# Patient Record
Sex: FEMALE
[De-identification: names, ages, dates, MRNs, and addresses within clinical notes are randomized per-mention and may not be internally consistent; named-entity substitution may affect disease eponyms.]

---

## 2017-07-20 ENCOUNTER — RX RENEWAL (OUTPATIENT)
Age: 72
End: 2017-07-20

## 2017-08-31 ENCOUNTER — APPOINTMENT (OUTPATIENT)
Dept: INTERNAL MEDICINE | Facility: CLINIC | Age: 72
End: 2017-08-31

## 2017-09-21 ENCOUNTER — APPOINTMENT (OUTPATIENT)
Dept: INTERNAL MEDICINE | Facility: CLINIC | Age: 72
End: 2017-09-21
Payer: MEDICARE

## 2017-09-21 ENCOUNTER — LABORATORY RESULT (OUTPATIENT)
Age: 72
End: 2017-09-21

## 2017-09-21 VITALS
BODY MASS INDEX: 28.47 KG/M2 | OXYGEN SATURATION: 98 % | RESPIRATION RATE: 16 BRPM | TEMPERATURE: 97.9 F | DIASTOLIC BLOOD PRESSURE: 80 MMHG | WEIGHT: 145 LBS | SYSTOLIC BLOOD PRESSURE: 130 MMHG | HEIGHT: 60 IN | HEART RATE: 80 BPM

## 2017-09-21 PROCEDURE — 99214 OFFICE O/P EST MOD 30 MIN: CPT

## 2017-09-22 LAB
25(OH)D3 SERPL-MCNC: 23.2 NG/ML
ALBUMIN SERPL ELPH-MCNC: 4.2 G/DL
ALP BLD-CCNC: 71 U/L
ALT SERPL-CCNC: 12 U/L
ANION GAP SERPL CALC-SCNC: 14 MMOL/L
APPEARANCE: CLEAR
AST SERPL-CCNC: 14 U/L
BASOPHILS # BLD AUTO: 0.02 K/UL
BASOPHILS NFR BLD AUTO: 0.2 %
BILIRUB SERPL-MCNC: 0.6 MG/DL
BILIRUBIN URINE: NEGATIVE
BLOOD URINE: ABNORMAL
BUN SERPL-MCNC: 14 MG/DL
CALCIUM SERPL-MCNC: 9.7 MG/DL
CHLORIDE SERPL-SCNC: 100 MMOL/L
CHOLEST SERPL-MCNC: 226 MG/DL
CHOLEST/HDLC SERPL: 3.4 RATIO
CO2 SERPL-SCNC: 27 MMOL/L
COLOR: YELLOW
CREAT SERPL-MCNC: 0.77 MG/DL
CREAT SPEC-SCNC: 22 MG/DL
EOSINOPHIL # BLD AUTO: 0.03 K/UL
EOSINOPHIL NFR BLD AUTO: 0.3 %
ERYTHROCYTE [SEDIMENTATION RATE] IN BLOOD BY WESTERGREN METHOD: 7 MM/HR
FOLATE SERPL-MCNC: >20 NG/ML
GGT SERPL-CCNC: 15 U/L
GLUCOSE QUALITATIVE U: NORMAL MG/DL
GLUCOSE SERPL-MCNC: 103 MG/DL
HBA1C MFR BLD HPLC: 5.5 %
HCT VFR BLD CALC: 41 %
HDLC SERPL-MCNC: 67 MG/DL
HGB BLD-MCNC: 13.4 G/DL
IMM GRANULOCYTES NFR BLD AUTO: 0.2 %
IRON SATN MFR SERPL: 27 %
IRON SERPL-MCNC: 103 UG/DL
KETONES URINE: NEGATIVE
LDLC SERPL CALC-MCNC: 137 MG/DL
LEUKOCYTE ESTERASE URINE: NEGATIVE
LYMPHOCYTES # BLD AUTO: 1.97 K/UL
LYMPHOCYTES NFR BLD AUTO: 18.7 %
MAN DIFF?: NORMAL
MCHC RBC-ENTMCNC: 29 PG
MCHC RBC-ENTMCNC: 32.7 GM/DL
MCV RBC AUTO: 88.7 FL
MICROALBUMIN 24H UR DL<=1MG/L-MCNC: 0.8 MG/DL
MICROALBUMIN/CREAT 24H UR-RTO: 36 MG/G
MONOCYTES # BLD AUTO: 0.64 K/UL
MONOCYTES NFR BLD AUTO: 6.1 %
NEUTROPHILS # BLD AUTO: 7.83 K/UL
NEUTROPHILS NFR BLD AUTO: 74.5 %
NITRITE URINE: NEGATIVE
PH URINE: 7
PLATELET # BLD AUTO: 330 K/UL
POTASSIUM SERPL-SCNC: 4.1 MMOL/L
PROT SERPL-MCNC: 6.8 G/DL
PROTEIN URINE: NEGATIVE MG/DL
RBC # BLD: 4.62 M/UL
RBC # FLD: 14 %
SODIUM SERPL-SCNC: 141 MMOL/L
SPECIFIC GRAVITY URINE: 1.01
T3 SERPL-MCNC: 134 NG/DL
T4 FREE SERPL-MCNC: 1.6 NG/DL
TIBC SERPL-MCNC: 385 UG/DL
TRIGL SERPL-MCNC: 109 MG/DL
TSH SERPL-ACNC: 2.35 UIU/ML
UIBC SERPL-MCNC: 282 UG/DL
UROBILINOGEN URINE: NORMAL MG/DL
VIT B12 SERPL-MCNC: 934 PG/ML
WBC # FLD AUTO: 10.51 K/UL

## 2018-07-01 ENCOUNTER — RX RENEWAL (OUTPATIENT)
Age: 73
End: 2018-07-01

## 2019-04-24 NOTE — COUNSELING
[Healthy eating counseling provided] : healthy eating [Weight management counseling provided] : Weight management [Activity counseling provided] : activity [None] : None [Good understanding] : Patient has a good understanding of lifestyle changes and the steps needed to achieve self management goals

## 2019-04-25 ENCOUNTER — APPOINTMENT (OUTPATIENT)
Dept: INTERNAL MEDICINE | Facility: CLINIC | Age: 74
End: 2019-04-25
Payer: MEDICARE

## 2019-04-25 VITALS
SYSTOLIC BLOOD PRESSURE: 140 MMHG | WEIGHT: 168 LBS | TEMPERATURE: 98.1 F | DIASTOLIC BLOOD PRESSURE: 80 MMHG | RESPIRATION RATE: 16 BRPM | HEIGHT: 60 IN | HEART RATE: 69 BPM | BODY MASS INDEX: 32.98 KG/M2 | OXYGEN SATURATION: 96 %

## 2019-04-25 PROCEDURE — G0439: CPT

## 2019-04-25 PROCEDURE — 90670 PCV13 VACCINE IM: CPT

## 2019-04-25 PROCEDURE — G0009: CPT

## 2019-04-25 NOTE — HISTORY OF PRESENT ILLNESS
[de-identified] : 74 year old female patient with history of stable Essential Hypertension, Hypercholesterolemia, GERD without esophagitis, Vitamin D Deficiency, history as stated, presented for an annual preventative examination. Patient denies any associated symptoms of shortness of breath, chest pain, abdominal pain at this time.\par \par

## 2019-04-25 NOTE — ASSESSMENT
[FreeTextEntry1] : 74 year old female found to have stable Essential Hypertension, Hypercholesterolemia, GERD without esophagitis, Vitamin D Deficiency,with the current regimen, diet and life style modifications, as counseled. Prior results reviewed and discussed with the patient during today's examination. Plan as ordered.\par Refusing EKG/Colonoscopy at this time, in spite of counseling.

## 2019-04-25 NOTE — HEALTH RISK ASSESSMENT
[Good] : ~his/her~  mood as  good [No falls in past year] : Patient reported no falls in the past year [0] : 2) Feeling down, depressed, or hopeless: Not at all (0) [Patient declined mammogram] : Patient declined mammogram [Patient declined bone density test] : Patient declined bone density test [Patient declined PAP Smear] : Patient declined PAP Smear [HIV test declined] : HIV test declined [Hepatitis C test offered] : Hepatitis C test offered [Patient declined colonoscopy] : Patient declined colonoscopy [Feels Safe at Home] : Feels safe at home [None] : None [Fully functional (using the telephone, shopping, preparing meals, housekeeping, doing laundry, using] : Fully functional and needs no help or supervision to perform IADLs (using the telephone, shopping, preparing meals, housekeeping, doing laundry, using transportation, managing medications and managing finances) [Fully functional (bathing, dressing, toileting, transferring, walking, feeding)] : Fully functional (bathing, dressing, toileting, transferring, walking, feeding) [Seat Belt] :  uses seat belt [Carbon Monoxide Detector] : carbon monoxide detector [Smoke Detector] : smoke detector [Sunscreen] : uses sunscreen [With Patient/Caregiver] : With Patient/Caregiver [FreeTextEntry1] : Check up\par  [] : No [de-identified] : None [Change in mental status noted] : No change in mental status noted [[id number]] : 0 [Reports changes in hearing] : Reports no changes in hearing [Reports changes in vision] : Reports no changes in vision [Reports changes in dental health] : Reports no changes in dental health [AdvancecareDate] : 04/19

## 2019-04-29 LAB
25(OH)D3 SERPL-MCNC: 17.9 NG/ML
ALBUMIN SERPL ELPH-MCNC: 4.6 G/DL
ALP BLD-CCNC: 92 U/L
ALT SERPL-CCNC: 22 U/L
ANION GAP SERPL CALC-SCNC: 14 MMOL/L
APPEARANCE: CLEAR
AST SERPL-CCNC: 18 U/L
BASOPHILS # BLD AUTO: 0.03 K/UL
BASOPHILS NFR BLD AUTO: 0.3 %
BILIRUB SERPL-MCNC: 0.6 MG/DL
BILIRUBIN URINE: NEGATIVE
BLOOD URINE: NEGATIVE
BUN SERPL-MCNC: 14 MG/DL
CALCIUM SERPL-MCNC: 10 MG/DL
CHLORIDE SERPL-SCNC: 98 MMOL/L
CHOLEST SERPL-MCNC: 270 MG/DL
CHOLEST/HDLC SERPL: 4.7 RATIO
CO2 SERPL-SCNC: 26 MMOL/L
COLOR: NORMAL
CREAT SERPL-MCNC: 0.65 MG/DL
CREAT SPEC-SCNC: 41 MG/DL
EOSINOPHIL # BLD AUTO: 0.09 K/UL
EOSINOPHIL NFR BLD AUTO: 0.9 %
ERYTHROCYTE [SEDIMENTATION RATE] IN BLOOD BY WESTERGREN METHOD: 26 MM/HR
ESTIMATED AVERAGE GLUCOSE: 111 MG/DL
FOLATE SERPL-MCNC: >20 NG/ML
GGT SERPL-CCNC: 25 U/L
GLUCOSE QUALITATIVE U: NEGATIVE
GLUCOSE SERPL-MCNC: 97 MG/DL
HBA1C MFR BLD HPLC: 5.5 %
HCT VFR BLD CALC: 43.4 %
HCV AB SER QL: NONREACTIVE
HCV S/CO RATIO: 0.12 S/CO
HDLC SERPL-MCNC: 58 MG/DL
HGB BLD-MCNC: 14 G/DL
IMM GRANULOCYTES NFR BLD AUTO: 0.4 %
IRON SATN MFR SERPL: 20 %
IRON SERPL-MCNC: 85 UG/DL
KETONES URINE: NEGATIVE
LDLC SERPL CALC-MCNC: 188 MG/DL
LEUKOCYTE ESTERASE URINE: NEGATIVE
LYMPHOCYTES # BLD AUTO: 2.79 K/UL
LYMPHOCYTES NFR BLD AUTO: 28.3 %
MAN DIFF?: NORMAL
MCHC RBC-ENTMCNC: 28.9 PG
MCHC RBC-ENTMCNC: 32.3 GM/DL
MCV RBC AUTO: 89.5 FL
MICROALBUMIN 24H UR DL<=1MG/L-MCNC: <1.2 MG/DL
MICROALBUMIN/CREAT 24H UR-RTO: NORMAL MG/G
MONOCYTES # BLD AUTO: 0.73 K/UL
MONOCYTES NFR BLD AUTO: 7.4 %
NEUTROPHILS # BLD AUTO: 6.18 K/UL
NEUTROPHILS NFR BLD AUTO: 62.7 %
NITRITE URINE: NEGATIVE
PH URINE: 6.5
PLATELET # BLD AUTO: 316 K/UL
POTASSIUM SERPL-SCNC: 4.3 MMOL/L
PROT SERPL-MCNC: 7.4 G/DL
PROTEIN URINE: NEGATIVE
RBC # BLD: 4.85 M/UL
RBC # FLD: 13.5 %
SODIUM SERPL-SCNC: 137 MMOL/L
SPECIFIC GRAVITY URINE: 1.01
T3 SERPL-MCNC: 133 NG/DL
T4 FREE SERPL-MCNC: 1.3 NG/DL
TIBC SERPL-MCNC: 417 UG/DL
TRIGL SERPL-MCNC: 120 MG/DL
TSH SERPL-ACNC: 3.46 UIU/ML
UIBC SERPL-MCNC: 332 UG/DL
UROBILINOGEN URINE: NORMAL
VIT B12 SERPL-MCNC: 985 PG/ML
WBC # FLD AUTO: 9.86 K/UL

## 2019-05-09 ENCOUNTER — RX RENEWAL (OUTPATIENT)
Age: 74
End: 2019-05-09

## 2019-05-21 LAB — HEMOCCULT STL QL IA: NEGATIVE

## 2020-04-28 ENCOUNTER — APPOINTMENT (OUTPATIENT)
Dept: INTERNAL MEDICINE | Facility: CLINIC | Age: 75
End: 2020-04-28
Payer: MEDICARE

## 2020-04-28 VITALS — WEIGHT: 166 LBS | HEIGHT: 60 IN | BODY MASS INDEX: 32.59 KG/M2

## 2020-04-28 PROCEDURE — 99441: CPT | Mod: CR

## 2020-04-28 RX ORDER — PANTOPRAZOLE 40 MG/1
40 TABLET, DELAYED RELEASE ORAL
Qty: 90 | Refills: 1 | Status: ACTIVE | COMMUNITY
Start: 2017-09-21 | End: 1900-01-01

## 2020-04-28 NOTE — ASSESSMENT
[FreeTextEntry1] : 75 year F patient found to have stable Essential Hypertension, Hypercholesterolemia, GERD without esophagitis, Vitamin D Deficiency,with the current regimen, diet and life style modifications, as counseled. Prior results reviewed and discussed with the patient during today's encounter. Plan as ordered.\par

## 2020-05-07 LAB
25(OH)D3 SERPL-MCNC: 22.7 NG/ML
ALBUMIN SERPL ELPH-MCNC: 4.3 G/DL
ALP BLD-CCNC: 96 U/L
ALT SERPL-CCNC: 12 U/L
ANION GAP SERPL CALC-SCNC: 17 MMOL/L
APPEARANCE: CLEAR
AST SERPL-CCNC: 16 U/L
BASOPHILS # BLD AUTO: 0.04 K/UL
BASOPHILS NFR BLD AUTO: 0.5 %
BILIRUB SERPL-MCNC: 0.3 MG/DL
BILIRUBIN URINE: NEGATIVE
BLOOD URINE: NORMAL
BUN SERPL-MCNC: 19 MG/DL
CALCIUM SERPL-MCNC: 9.6 MG/DL
CHLORIDE SERPL-SCNC: 100 MMOL/L
CHOLEST SERPL-MCNC: 236 MG/DL
CHOLEST/HDLC SERPL: 3.6 RATIO
CO2 SERPL-SCNC: 23 MMOL/L
COLOR: YELLOW
CREAT SERPL-MCNC: 0.71 MG/DL
CREAT SPEC-SCNC: 105 MG/DL
EOSINOPHIL # BLD AUTO: 0.11 K/UL
EOSINOPHIL NFR BLD AUTO: 1.3 %
ERYTHROCYTE [SEDIMENTATION RATE] IN BLOOD BY WESTERGREN METHOD: 25 MM/HR
FOLATE SERPL-MCNC: 18.8 NG/ML
GGT SERPL-CCNC: 17 U/L
GLUCOSE QUALITATIVE U: NEGATIVE
GLUCOSE SERPL-MCNC: 83 MG/DL
HCT VFR BLD CALC: 42.4 %
HDLC SERPL-MCNC: 65 MG/DL
HGB BLD-MCNC: 13.5 G/DL
IMM GRANULOCYTES NFR BLD AUTO: 0.2 %
IRON SATN MFR SERPL: 18 %
IRON SERPL-MCNC: 67 UG/DL
KETONES URINE: NEGATIVE
LDLC SERPL CALC-MCNC: 157 MG/DL
LEUKOCYTE ESTERASE URINE: NEGATIVE
LYMPHOCYTES # BLD AUTO: 2.59 K/UL
LYMPHOCYTES NFR BLD AUTO: 31.5 %
MAN DIFF?: NORMAL
MCHC RBC-ENTMCNC: 28.6 PG
MCHC RBC-ENTMCNC: 31.8 GM/DL
MCV RBC AUTO: 89.8 FL
MICROALBUMIN 24H UR DL<=1MG/L-MCNC: <1.2 MG/DL
MICROALBUMIN/CREAT 24H UR-RTO: NORMAL MG/G
MONOCYTES # BLD AUTO: 0.65 K/UL
MONOCYTES NFR BLD AUTO: 7.9 %
NEUTROPHILS # BLD AUTO: 4.82 K/UL
NEUTROPHILS NFR BLD AUTO: 58.6 %
NITRITE URINE: NEGATIVE
PH URINE: 5.5
PLATELET # BLD AUTO: 318 K/UL
POTASSIUM SERPL-SCNC: 4.2 MMOL/L
PROT SERPL-MCNC: 6.8 G/DL
PROTEIN URINE: NEGATIVE
RBC # BLD: 4.72 M/UL
RBC # FLD: 13.4 %
SODIUM SERPL-SCNC: 139 MMOL/L
SPECIFIC GRAVITY URINE: 1.02
T3 SERPL-MCNC: 127 NG/DL
T4 FREE SERPL-MCNC: 1.2 NG/DL
TIBC SERPL-MCNC: 379 UG/DL
TRIGL SERPL-MCNC: 69 MG/DL
TSH SERPL-ACNC: 2.99 UIU/ML
UIBC SERPL-MCNC: 312 UG/DL
UROBILINOGEN URINE: NORMAL
VIT B12 SERPL-MCNC: 672 PG/ML
WBC # FLD AUTO: 8.23 K/UL

## 2020-05-14 DIAGNOSIS — E78.2 MIXED HYPERLIPIDEMIA: ICD-10-CM

## 2020-05-14 RX ORDER — ICOSAPENT ETHYL 1000 MG/1
1 CAPSULE ORAL
Qty: 180 | Refills: 3 | Status: ACTIVE | COMMUNITY
Start: 2020-05-14 | End: 1900-01-01

## 2020-05-14 RX ORDER — OMEGA-3-ACID ETHYL ESTERS CAPSULES 1 G/1
1 CAPSULE, LIQUID FILLED ORAL
Qty: 360 | Refills: 3 | Status: DISCONTINUED | COMMUNITY
Start: 2020-05-14 | End: 2020-05-14

## 2020-08-07 LAB
ESTIMATED AVERAGE GLUCOSE: 108 MG/DL
HBA1C MFR BLD HPLC: 5.4 %

## 2021-04-01 ENCOUNTER — RX RENEWAL (OUTPATIENT)
Age: 76
End: 2021-04-01

## 2021-04-15 ENCOUNTER — APPOINTMENT (OUTPATIENT)
Dept: INTERNAL MEDICINE | Facility: CLINIC | Age: 76
End: 2021-04-15
Payer: MEDICARE

## 2021-04-15 VITALS
HEIGHT: 60 IN | DIASTOLIC BLOOD PRESSURE: 89 MMHG | BODY MASS INDEX: 34.75 KG/M2 | RESPIRATION RATE: 16 BRPM | SYSTOLIC BLOOD PRESSURE: 154 MMHG | OXYGEN SATURATION: 97 % | HEART RATE: 73 BPM | WEIGHT: 177 LBS | TEMPERATURE: 97.7 F

## 2021-04-15 PROCEDURE — G0439: CPT

## 2021-04-15 NOTE — ASSESSMENT
[FreeTextEntry1] : 76 year old female found to have stable Essential Hypertension, Hypercholesterolemia, GERD without esophagitis, Vitamin D Deficiency,with the current prescription regimen as recommended, diet and life style modifications, as counseled. Prior results reviewed, interpreted and discussed with the patient during today's examination, as appropriate. Follow up, treatment plan and tests, as ordered.\par \par Supplemental history was obtained from daughter, who is accompanying the patient for today's examination.\par \par EKG/MAMMO/COLONOSCOPY/DEXA SCAN declined.

## 2021-04-15 NOTE — HISTORY OF PRESENT ILLNESS
[de-identified] : 76 year old female patient with history of stable Essential Hypertension, Hypercholesterolemia, GERD without esophagitis, Vitamin D Deficiency, history as stated, presented for an annual preventative examination.\par Patient denies any associated symptoms of shortness of breath, chest pain, abdominal pain at this time.\par

## 2021-04-16 LAB
25(OH)D3 SERPL-MCNC: 26.9 NG/ML
ALBUMIN SERPL ELPH-MCNC: 4.2 G/DL
ALP BLD-CCNC: 97 U/L
ALT SERPL-CCNC: 17 U/L
ANION GAP SERPL CALC-SCNC: 12 MMOL/L
APPEARANCE: CLEAR
AST SERPL-CCNC: 14 U/L
BASOPHILS # BLD AUTO: 0.03 K/UL
BASOPHILS NFR BLD AUTO: 0.4 %
BILIRUB SERPL-MCNC: 0.3 MG/DL
BILIRUBIN URINE: NEGATIVE
BLOOD URINE: NEGATIVE
BUN SERPL-MCNC: 16 MG/DL
CALCIUM SERPL-MCNC: 9.2 MG/DL
CHLORIDE SERPL-SCNC: 100 MMOL/L
CHOLEST SERPL-MCNC: 230 MG/DL
CO2 SERPL-SCNC: 24 MMOL/L
COLOR: COLORLESS
CREAT SERPL-MCNC: 0.59 MG/DL
CREAT SPEC-SCNC: 27 MG/DL
EOSINOPHIL # BLD AUTO: 0.08 K/UL
EOSINOPHIL NFR BLD AUTO: 1.1 %
ERYTHROCYTE [SEDIMENTATION RATE] IN BLOOD BY WESTERGREN METHOD: 13 MM/HR
ESTIMATED AVERAGE GLUCOSE: 111 MG/DL
FOLATE SERPL-MCNC: >20 NG/ML
GGT SERPL-CCNC: 25 U/L
GLUCOSE QUALITATIVE U: NEGATIVE
GLUCOSE SERPL-MCNC: 99 MG/DL
HBA1C MFR BLD HPLC: 5.5 %
HCT VFR BLD CALC: 41.7 %
HDLC SERPL-MCNC: 62 MG/DL
HGB BLD-MCNC: 13.5 G/DL
IMM GRANULOCYTES NFR BLD AUTO: 0.4 %
IRON SATN MFR SERPL: 15 %
IRON SERPL-MCNC: 61 UG/DL
KETONES URINE: NEGATIVE
LDLC SERPL CALC-MCNC: 146 MG/DL
LEUKOCYTE ESTERASE URINE: NEGATIVE
LYMPHOCYTES # BLD AUTO: 3.11 K/UL
LYMPHOCYTES NFR BLD AUTO: 41 %
MAN DIFF?: NORMAL
MCHC RBC-ENTMCNC: 28.8 PG
MCHC RBC-ENTMCNC: 32.4 GM/DL
MCV RBC AUTO: 89.1 FL
MICROALBUMIN 24H UR DL<=1MG/L-MCNC: <1.2 MG/DL
MICROALBUMIN/CREAT 24H UR-RTO: NORMAL MG/G
MONOCYTES # BLD AUTO: 0.55 K/UL
MONOCYTES NFR BLD AUTO: 7.2 %
NEUTROPHILS # BLD AUTO: 3.79 K/UL
NEUTROPHILS NFR BLD AUTO: 49.9 %
NITRITE URINE: NEGATIVE
NONHDLC SERPL-MCNC: 168 MG/DL
PH URINE: 6.5
PLATELET # BLD AUTO: 300 K/UL
POTASSIUM SERPL-SCNC: 4.2 MMOL/L
PROT SERPL-MCNC: 6.9 G/DL
PROTEIN URINE: NEGATIVE
RBC # BLD: 4.68 M/UL
RBC # FLD: 13.6 %
SODIUM SERPL-SCNC: 136 MMOL/L
SPECIFIC GRAVITY URINE: 1.01
T3 SERPL-MCNC: 139 NG/DL
T4 FREE SERPL-MCNC: 1.1 NG/DL
TIBC SERPL-MCNC: 409 UG/DL
TRIGL SERPL-MCNC: 109 MG/DL
TSH SERPL-ACNC: 3.15 UIU/ML
UIBC SERPL-MCNC: 349 UG/DL
UROBILINOGEN URINE: NORMAL
VIT B12 SERPL-MCNC: 871 PG/ML
WBC # FLD AUTO: 7.59 K/UL

## 2021-04-29 ENCOUNTER — NON-APPOINTMENT (OUTPATIENT)
Age: 76
End: 2021-04-29

## 2021-04-29 RX ORDER — VITAMIN E MIXED 1000 UNIT
500-200 CAPSULE ORAL
Qty: 180 | Refills: 3 | Status: ACTIVE | COMMUNITY
Start: 2021-04-29 | End: 1900-01-01

## 2022-03-28 ENCOUNTER — RX RENEWAL (OUTPATIENT)
Age: 77
End: 2022-03-28

## 2022-04-14 ENCOUNTER — APPOINTMENT (OUTPATIENT)
Dept: INTERNAL MEDICINE | Facility: CLINIC | Age: 77
End: 2022-04-14
Payer: MEDICARE

## 2022-04-14 VITALS
OXYGEN SATURATION: 98 % | TEMPERATURE: 97.6 F | HEIGHT: 60 IN | WEIGHT: 176 LBS | HEART RATE: 70 BPM | DIASTOLIC BLOOD PRESSURE: 86 MMHG | RESPIRATION RATE: 16 BRPM | SYSTOLIC BLOOD PRESSURE: 144 MMHG | BODY MASS INDEX: 34.55 KG/M2

## 2022-04-14 DIAGNOSIS — M54.9 DORSALGIA, UNSPECIFIED: ICD-10-CM

## 2022-04-14 DIAGNOSIS — M17.0 BILATERAL PRIMARY OSTEOARTHRITIS OF KNEE: ICD-10-CM

## 2022-04-14 PROCEDURE — G0439: CPT

## 2022-04-14 NOTE — HISTORY OF PRESENT ILLNESS
[de-identified] : 77 year old female patient with history of stable Essential Hypertension, Hypercholesterolemia, GERD without esophagitis, Vitamin D Deficiency, history as stated, presented for an annual preventative examination.\par Patient denies any associated symptoms of shortness of breath, chest pain, abdominal pain at this time.\par

## 2022-04-14 NOTE — ASSESSMENT
[FreeTextEntry1] : 77 year old female found to have stable Essential Hypertension, Hypercholesterolemia, GERD without esophagitis, Vitamin D Deficiency,with the current prescription regimen as recommended, diet and life style modifications, as counseled. Prior results reviewed, interpreted and discussed with the patient during today's examination, as appropriate. Follow up, treatment plan and tests, as ordered.\par \par Supplemental history was obtained from the daughter, who is accompanying the patient for today's examination.\par

## 2022-04-14 NOTE — HEALTH RISK ASSESSMENT
[Good] : ~his/her~  mood as  good [Never] : Never [No] : In the past 12 months have you used drugs other than those required for medical reasons? No [No falls in past year] : Patient reported no falls in the past year [0] : 2) Feeling down, depressed, or hopeless: Not at all (0) none [Patient declined mammogram] : Patient declined mammogram [Patient declined PAP Smear] : Patient declined PAP Smear [Patient declined bone density test] : Patient declined bone density test [Patient declined colonoscopy] : Patient declined colonoscopy [HIV test declined] : HIV test declined [None] : None [Feels Safe at Home] : Feels safe at home [Fully functional (bathing, dressing, toileting, transferring, walking, feeding)] : Fully functional (bathing, dressing, toileting, transferring, walking, feeding) [Fully functional (using the telephone, shopping, preparing meals, housekeeping, doing laundry, using] : Fully functional and needs no help or supervision to perform IADLs (using the telephone, shopping, preparing meals, housekeeping, doing laundry, using transportation, managing medications and managing finances) [Smoke Detector] : smoke detector [Carbon Monoxide Detector] : carbon monoxide detector [Seat Belt] :  uses seat belt [Sunscreen] : uses sunscreen [With Patient/Caregiver] : , with patient/caregiver [FreeTextEntry1] : Check up\par  [de-identified] : None [[id number]] : 0 [Change in mental status noted] : No change in mental status noted [Reports changes in hearing] : Reports no changes in hearing [Reports changes in vision] : Reports no changes in vision [Reports changes in dental health] : Reports no changes in dental health [HepatitisCDate] : 04/19 [HepatitisCComments] : Negative [AdvancecareDate] : 04/22

## 2022-04-16 LAB
25(OH)D3 SERPL-MCNC: 13.8 NG/ML
ALBUMIN SERPL ELPH-MCNC: 4.3 G/DL
ALP BLD-CCNC: 103 U/L
ALT SERPL-CCNC: 12 U/L
ANION GAP SERPL CALC-SCNC: 13 MMOL/L
APPEARANCE: CLEAR
AST SERPL-CCNC: 15 U/L
BACTERIA: NEGATIVE
BASOPHILS # BLD AUTO: 0.03 K/UL
BASOPHILS NFR BLD AUTO: 0.3 %
BILIRUB SERPL-MCNC: 0.5 MG/DL
BILIRUBIN URINE: NEGATIVE
BLOOD URINE: NEGATIVE
BUN SERPL-MCNC: 14 MG/DL
CALCIUM SERPL-MCNC: 10 MG/DL
CHLORIDE SERPL-SCNC: 101 MMOL/L
CHOLEST SERPL-MCNC: 251 MG/DL
CO2 SERPL-SCNC: 25 MMOL/L
COLOR: YELLOW
CREAT SERPL-MCNC: 0.71 MG/DL
CREAT SPEC-SCNC: 174 MG/DL
EGFR: 89 ML/MIN/1.73M2
EOSINOPHIL # BLD AUTO: 0.1 K/UL
EOSINOPHIL NFR BLD AUTO: 1.1 %
ESTIMATED AVERAGE GLUCOSE: 117 MG/DL
GGT SERPL-CCNC: 19 U/L
GLUCOSE QUALITATIVE U: NEGATIVE
GLUCOSE SERPL-MCNC: 90 MG/DL
HBA1C MFR BLD HPLC: 5.7 %
HCT VFR BLD CALC: 42.1 %
HDLC SERPL-MCNC: 58 MG/DL
HGB BLD-MCNC: 13.4 G/DL
HYALINE CASTS: 1 /LPF
IMM GRANULOCYTES NFR BLD AUTO: 0.3 %
KETONES URINE: NEGATIVE
LDLC SERPL CALC-MCNC: 169 MG/DL
LEUKOCYTE ESTERASE URINE: NEGATIVE
LYMPHOCYTES # BLD AUTO: 2.86 K/UL
LYMPHOCYTES NFR BLD AUTO: 31.2 %
MAN DIFF?: NORMAL
MCHC RBC-ENTMCNC: 28.4 PG
MCHC RBC-ENTMCNC: 31.8 GM/DL
MCV RBC AUTO: 89.2 FL
MICROALBUMIN 24H UR DL<=1MG/L-MCNC: 1.3 MG/DL
MICROALBUMIN/CREAT 24H UR-RTO: 7 MG/G
MICROSCOPIC-UA: NORMAL
MONOCYTES # BLD AUTO: 0.72 K/UL
MONOCYTES NFR BLD AUTO: 7.8 %
NEUTROPHILS # BLD AUTO: 5.44 K/UL
NEUTROPHILS NFR BLD AUTO: 59.3 %
NITRITE URINE: NEGATIVE
NONHDLC SERPL-MCNC: 193 MG/DL
PH URINE: 6
PLATELET # BLD AUTO: 345 K/UL
POTASSIUM SERPL-SCNC: 4.4 MMOL/L
PROT SERPL-MCNC: 7 G/DL
PROTEIN URINE: NORMAL
RBC # BLD: 4.72 M/UL
RBC # FLD: 14.3 %
RED BLOOD CELLS URINE: 4 /HPF
SODIUM SERPL-SCNC: 139 MMOL/L
SPECIFIC GRAVITY URINE: 1.02
SQUAMOUS EPITHELIAL CELLS: 2 /HPF
TRIGL SERPL-MCNC: 122 MG/DL
TSH SERPL-ACNC: 3.27 UIU/ML
UROBILINOGEN URINE: NORMAL
WBC # FLD AUTO: 9.18 K/UL
WHITE BLOOD CELLS URINE: 2 /HPF

## 2023-01-09 ENCOUNTER — APPOINTMENT (OUTPATIENT)
Dept: INTERNAL MEDICINE | Facility: CLINIC | Age: 78
End: 2023-01-09
Payer: MEDICARE

## 2023-01-09 ENCOUNTER — NON-APPOINTMENT (OUTPATIENT)
Age: 78
End: 2023-01-09

## 2023-01-09 VITALS
SYSTOLIC BLOOD PRESSURE: 142 MMHG | HEIGHT: 60 IN | BODY MASS INDEX: 34.36 KG/M2 | WEIGHT: 175 LBS | HEART RATE: 74 BPM | OXYGEN SATURATION: 95 % | TEMPERATURE: 97.4 F | DIASTOLIC BLOOD PRESSURE: 85 MMHG | RESPIRATION RATE: 17 BRPM

## 2023-01-09 DIAGNOSIS — K21.9 GASTRO-ESOPHAGEAL REFLUX DISEASE W/OUT ESOPHAGITIS: ICD-10-CM

## 2023-01-09 DIAGNOSIS — I10 ESSENTIAL (PRIMARY) HYPERTENSION: ICD-10-CM

## 2023-01-09 DIAGNOSIS — E78.00 PURE HYPERCHOLESTEROLEMIA, UNSPECIFIED: ICD-10-CM

## 2023-01-09 DIAGNOSIS — E55.9 VITAMIN D DEFICIENCY, UNSPECIFIED: ICD-10-CM

## 2023-01-09 PROCEDURE — 93000 ELECTROCARDIOGRAM COMPLETE: CPT

## 2023-01-09 PROCEDURE — 99213 OFFICE O/P EST LOW 20 MIN: CPT | Mod: 25

## 2023-01-09 NOTE — ASSESSMENT
[FreeTextEntry1] : 78 year old female found to have stable Essential Hypertension, Hypercholesterolemia, GERD without esophagitis, Vitamin D Deficiency,with the current prescription regimen as recommended, diet and life style modifications, as counseled. Prior results reviewed, interpreted and discussed with the patient during today's examination, as appropriate. Follow up, treatment plan and tests, as ordered.\par \par EKG:\par Sinus Rhythm @ 82 BPM.\par No new ST-T changes noted.\par \par Total time spent : 25 minutes\par Including:\par Preparation prior to visit - Reviewing prior record, results of tests and Consultation Reports as applicable\par Conducting an appropriate H & P during today's encounter\par Appropriate orders for tests, medications and procedures, as applicable\par Counseling patient \par Note completion\par

## 2023-01-09 NOTE — HISTORY OF PRESENT ILLNESS
[de-identified] : 78 year old  female patient with history of stable Essential Hypertension, Hypercholesterolemia, GERD without esophagitis, Vitamin D Deficiency, history as stated, presented for follow up examination. Patient is compliant with all medications. ROS as stated.\par

## 2023-01-09 NOTE — HEALTH RISK ASSESSMENT
[Never] : Never [No] : In the past 12 months have you used drugs other than those required for medical reasons? No [No falls in past year] : Patient reported no falls in the past year [0] : 2) Feeling down, depressed, or hopeless: Not at all (0) [Patient declined bone density test] : Patient declined bone density test [de-identified] : None [[id number]] : 0

## 2023-01-10 LAB
25(OH)D3 SERPL-MCNC: 30.8 NG/ML
ALBUMIN SERPL ELPH-MCNC: 4.3 G/DL
ALP BLD-CCNC: 103 U/L
ALT SERPL-CCNC: 13 U/L
ANION GAP SERPL CALC-SCNC: 12 MMOL/L
APPEARANCE: ABNORMAL
AST SERPL-CCNC: 19 U/L
BACTERIA: NEGATIVE
BASOPHILS # BLD AUTO: 0.05 K/UL
BASOPHILS NFR BLD AUTO: 0.6 %
BILIRUB SERPL-MCNC: 0.4 MG/DL
BILIRUBIN URINE: NEGATIVE
BLOOD URINE: NEGATIVE
BUN SERPL-MCNC: 19 MG/DL
CALCIUM SERPL-MCNC: 9.6 MG/DL
CHLORIDE SERPL-SCNC: 101 MMOL/L
CHOLEST SERPL-MCNC: 257 MG/DL
CO2 SERPL-SCNC: 25 MMOL/L
COLOR: NORMAL
CREAT SERPL-MCNC: 0.65 MG/DL
CREAT SPEC-SCNC: 82 MG/DL
EGFR: 91 ML/MIN/1.73M2
EOSINOPHIL # BLD AUTO: 0.11 K/UL
EOSINOPHIL NFR BLD AUTO: 1.3 %
ESTIMATED AVERAGE GLUCOSE: 120 MG/DL
GGT SERPL-CCNC: 15 U/L
GLUCOSE QUALITATIVE U: NEGATIVE
GLUCOSE SERPL-MCNC: 98 MG/DL
HBA1C MFR BLD HPLC: 5.8 %
HCT VFR BLD CALC: 41.8 %
HDLC SERPL-MCNC: 62 MG/DL
HGB BLD-MCNC: 13.4 G/DL
HYALINE CASTS: 2 /LPF
IMM GRANULOCYTES NFR BLD AUTO: 0.5 %
KETONES URINE: NEGATIVE
LDLC SERPL CALC-MCNC: 178 MG/DL
LEUKOCYTE ESTERASE URINE: ABNORMAL
LYMPHOCYTES # BLD AUTO: 2.74 K/UL
LYMPHOCYTES NFR BLD AUTO: 33 %
MAN DIFF?: NORMAL
MCHC RBC-ENTMCNC: 28.2 PG
MCHC RBC-ENTMCNC: 32.1 GM/DL
MCV RBC AUTO: 88 FL
MICROALBUMIN 24H UR DL<=1MG/L-MCNC: 1.7 MG/DL
MICROALBUMIN/CREAT 24H UR-RTO: 21 MG/G
MICROSCOPIC-UA: NORMAL
MONOCYTES # BLD AUTO: 0.61 K/UL
MONOCYTES NFR BLD AUTO: 7.3 %
NEUTROPHILS # BLD AUTO: 4.75 K/UL
NEUTROPHILS NFR BLD AUTO: 57.3 %
NITRITE URINE: NEGATIVE
NONHDLC SERPL-MCNC: 194 MG/DL
PH URINE: 6
PLATELET # BLD AUTO: 346 K/UL
POTASSIUM SERPL-SCNC: 4.4 MMOL/L
PROT SERPL-MCNC: 6.9 G/DL
PROTEIN URINE: NEGATIVE
RBC # BLD: 4.75 M/UL
RBC # FLD: 14.1 %
RED BLOOD CELLS URINE: 1 /HPF
SODIUM SERPL-SCNC: 138 MMOL/L
SPECIFIC GRAVITY URINE: 1.02
SQUAMOUS EPITHELIAL CELLS: 21 /HPF
TRIGL SERPL-MCNC: 83 MG/DL
TSH SERPL-ACNC: 3.31 UIU/ML
UROBILINOGEN URINE: NORMAL
WBC # FLD AUTO: 8.3 K/UL
WHITE BLOOD CELLS URINE: 17 /HPF

## 2023-03-22 ENCOUNTER — RX RENEWAL (OUTPATIENT)
Age: 78
End: 2023-03-22

## 2023-08-05 ENCOUNTER — TRANSCRIPTION ENCOUNTER (OUTPATIENT)
Age: 78
End: 2023-08-05

## 2024-02-22 ENCOUNTER — RX RENEWAL (OUTPATIENT)
Age: 79
End: 2024-02-22

## 2024-03-19 ENCOUNTER — APPOINTMENT (OUTPATIENT)
Dept: INTERNAL MEDICINE | Facility: CLINIC | Age: 79
End: 2024-03-19
Payer: MEDICARE

## 2024-03-19 VITALS
HEIGHT: 60 IN | SYSTOLIC BLOOD PRESSURE: 144 MMHG | HEART RATE: 91 BPM | OXYGEN SATURATION: 95 % | TEMPERATURE: 98 F | BODY MASS INDEX: 32.98 KG/M2 | DIASTOLIC BLOOD PRESSURE: 80 MMHG | RESPIRATION RATE: 18 BRPM | WEIGHT: 168 LBS

## 2024-03-19 DIAGNOSIS — Z00.00 ENCOUNTER FOR GENERAL ADULT MEDICAL EXAMINATION W/OUT ABNORMAL FINDINGS: ICD-10-CM

## 2024-03-19 PROCEDURE — G0439: CPT

## 2024-03-19 PROCEDURE — 36415 COLL VENOUS BLD VENIPUNCTURE: CPT

## 2024-03-19 NOTE — ASSESSMENT
[FreeTextEntry1] : 79 year old female found to have stable Essential Hypertension, Hypercholesterolemia, GERD without esophagitis, Vitamin D Deficiency, with the current prescription regimen as recommended, diet and lifestyle modifications, as counseled. Prior results reviewed, interpreted and discussed with the patient during today's examination, as appropriate. Follow up, treatment plan and tests, as ordered.  Supplemental history was obtained from the daughter, who is accompanying the patient for today's examination.  EKG declined for the future visit, as counseled.

## 2024-03-19 NOTE — HISTORY OF PRESENT ILLNESS
[de-identified] : 79 year old female patient with history of stable Essential Hypertension, Hypercholesterolemia, GERD without esophagitis, Vitamin D Deficiency, history as stated, presented for an annual preventative examination.

## 2024-03-19 NOTE — HEALTH RISK ASSESSMENT
[Good] : ~his/her~  mood as  good [No] : In the past 12 months have you used drugs other than those required for medical reasons? No [No falls in past year] : Patient reported no falls in the past year [0] : 2) Feeling down, depressed, or hopeless: Not at all (0) [Patient declined PAP Smear] : Patient declined PAP Smear [Patient declined mammogram] : Patient declined mammogram [Patient declined bone density test] : Patient declined bone density test [Patient declined colonoscopy] : Patient declined colonoscopy [HIV test declined] : HIV test declined [None] : None [Feels Safe at Home] : Feels safe at home [Fully functional (bathing, dressing, toileting, transferring, walking, feeding)] : Fully functional (bathing, dressing, toileting, transferring, walking, feeding) [Fully functional (using the telephone, shopping, preparing meals, housekeeping, doing laundry, using] : Fully functional and needs no help or supervision to perform IADLs (using the telephone, shopping, preparing meals, housekeeping, doing laundry, using transportation, managing medications and managing finances) [Smoke Detector] : smoke detector [Carbon Monoxide Detector] : carbon monoxide detector [Seat Belt] :  uses seat belt [Sunscreen] : uses sunscreen [With Patient/Caregiver] : , with patient/caregiver [Never] : Never [FreeTextEntry1] : Check up\par   [de-identified] : None [[id number]] : 0 [Change in mental status noted] : No change in mental status noted [Reports changes in hearing] : Reports no changes in hearing [Reports changes in vision] : Reports no changes in vision [Reports changes in dental health] : Reports no changes in dental health [HepatitisCDate] : 04/19 [HepatitisCComments] : Negative [AdvancecareDate] : 03/24

## 2024-03-20 ENCOUNTER — RX RENEWAL (OUTPATIENT)
Age: 79
End: 2024-03-20

## 2024-03-21 LAB
ALBUMIN SERPL ELPH-MCNC: 4.3 G/DL
ALP BLD-CCNC: 83 U/L
ALT SERPL-CCNC: 15 U/L
ANION GAP SERPL CALC-SCNC: 12 MMOL/L
AST SERPL-CCNC: 16 U/L
BASOPHILS # BLD AUTO: 0.05 K/UL
BASOPHILS NFR BLD AUTO: 0.5 %
BILIRUB SERPL-MCNC: 0.6 MG/DL
BUN SERPL-MCNC: 10 MG/DL
CALCIUM SERPL-MCNC: 9.7 MG/DL
CHLORIDE SERPL-SCNC: 102 MMOL/L
CHOLEST SERPL-MCNC: 223 MG/DL
CO2 SERPL-SCNC: 26 MMOL/L
CREAT SERPL-MCNC: 0.76 MG/DL
EGFR: 81 ML/MIN/1.73M2
EOSINOPHIL # BLD AUTO: 0.05 K/UL
EOSINOPHIL NFR BLD AUTO: 0.5 %
ESTIMATED AVERAGE GLUCOSE: 120 MG/DL
GLUCOSE SERPL-MCNC: 128 MG/DL
HBA1C MFR BLD HPLC: 5.8 %
HCT VFR BLD CALC: 43.9 %
HDLC SERPL-MCNC: 57 MG/DL
HGB BLD-MCNC: 14.3 G/DL
IMM GRANULOCYTES NFR BLD AUTO: 0.4 %
LDLC SERPL CALC-MCNC: 152 MG/DL
LYMPHOCYTES # BLD AUTO: 2.78 K/UL
LYMPHOCYTES NFR BLD AUTO: 25.5 %
MAN DIFF?: NORMAL
MCHC RBC-ENTMCNC: 28.8 PG
MCHC RBC-ENTMCNC: 32.6 GM/DL
MCV RBC AUTO: 88.5 FL
MONOCYTES # BLD AUTO: 0.75 K/UL
MONOCYTES NFR BLD AUTO: 6.9 %
NEUTROPHILS # BLD AUTO: 7.24 K/UL
NEUTROPHILS NFR BLD AUTO: 66.2 %
NONHDLC SERPL-MCNC: 166 MG/DL
PLATELET # BLD AUTO: 330 K/UL
POTASSIUM SERPL-SCNC: 4.4 MMOL/L
PROT SERPL-MCNC: 6.8 G/DL
RBC # BLD: 4.96 M/UL
RBC # FLD: 14.5 %
SODIUM SERPL-SCNC: 140 MMOL/L
TRIGL SERPL-MCNC: 84 MG/DL
TSH SERPL-ACNC: 3.32 UIU/ML
WBC # FLD AUTO: 10.91 K/UL

## 2025-02-17 ENCOUNTER — RX RENEWAL (OUTPATIENT)
Age: 80
End: 2025-02-17

## 2025-05-12 ENCOUNTER — APPOINTMENT (OUTPATIENT)
Dept: INTERNAL MEDICINE | Facility: CLINIC | Age: 80
End: 2025-05-12